# Patient Record
Sex: FEMALE | Race: WHITE | NOT HISPANIC OR LATINO | Employment: FULL TIME | URBAN - METROPOLITAN AREA
[De-identification: names, ages, dates, MRNs, and addresses within clinical notes are randomized per-mention and may not be internally consistent; named-entity substitution may affect disease eponyms.]

---

## 2022-08-09 ENCOUNTER — LAB REQUISITION (OUTPATIENT)
Dept: LAB | Facility: CLINIC | Age: 52
End: 2022-08-09

## 2022-08-09 DIAGNOSIS — Z01.419 ENCOUNTER FOR GYNECOLOGICAL EXAMINATION (GENERAL) (ROUTINE) WITHOUT ABNORMAL FINDINGS: ICD-10-CM

## 2022-08-09 LAB
CHOLEST SERPL-MCNC: 228 MG/DL
HBA1C MFR BLD: 5.6 %
HDLC SERPL-MCNC: 78 MG/DL
LDLC SERPL CALC-MCNC: 138 MG/DL
NONHDLC SERPL-MCNC: 150 MG/DL
TRIGL SERPL-MCNC: 58 MG/DL
TSH SERPL DL<=0.005 MIU/L-ACNC: 0.93 UIU/ML (ref 0.3–4.2)

## 2022-08-09 PROCEDURE — 83036 HEMOGLOBIN GLYCOSYLATED A1C: CPT | Performed by: OBSTETRICS & GYNECOLOGY

## 2022-08-09 PROCEDURE — 80061 LIPID PANEL: CPT | Performed by: OBSTETRICS & GYNECOLOGY

## 2022-08-09 PROCEDURE — 84443 ASSAY THYROID STIM HORMONE: CPT | Performed by: OBSTETRICS & GYNECOLOGY

## 2023-02-24 ENCOUNTER — TRANSCRIBE ORDERS (OUTPATIENT)
Dept: OTHER | Age: 53
End: 2023-02-24

## 2023-02-24 DIAGNOSIS — M54.32 SCIATICA OF LEFT SIDE: Primary | ICD-10-CM

## 2023-05-05 ENCOUNTER — THERAPY VISIT (OUTPATIENT)
Dept: PHYSICAL THERAPY | Facility: CLINIC | Age: 53
End: 2023-05-05
Attending: PHYSICIAN ASSISTANT
Payer: COMMERCIAL

## 2023-05-05 DIAGNOSIS — G89.29 CHRONIC BILATERAL LOW BACK PAIN WITH LEFT-SIDED SCIATICA: ICD-10-CM

## 2023-05-05 DIAGNOSIS — M54.42 CHRONIC BILATERAL LOW BACK PAIN WITH LEFT-SIDED SCIATICA: ICD-10-CM

## 2023-05-05 PROCEDURE — 97161 PT EVAL LOW COMPLEX 20 MIN: CPT | Mod: GP | Performed by: PHYSICAL THERAPIST

## 2023-05-05 PROCEDURE — 97110 THERAPEUTIC EXERCISES: CPT | Mod: GP | Performed by: PHYSICAL THERAPIST

## 2023-05-05 PROCEDURE — 97112 NEUROMUSCULAR REEDUCATION: CPT | Mod: GP | Performed by: PHYSICAL THERAPIST

## 2023-05-05 NOTE — PROGRESS NOTES
Physical Therapy Initial Evaluation  Subjective:    Patient Health History  Zeynep Truong being seen for LBP with L sciatica.     Problem began: 2/24/2023 (MD appt).   Problem occurred: Patient reporting that her symptoms have been on/off for a few years after starting to increase activity with snow shoeing/hiking   Pain is reported as 2/10 (pu to 8/10) on pain scale.  General health as reported by patient is excellent.  Pertinent medical history includes: none.   Red flags:  Pain at rest/night.  Medical allergies: none.   Surgeries include:  None.    Current medications:  None.    Current occupation is Teacher.   Primary job tasks include:  Prolonged standing.   Other job/home tasks details: , work at a [employer].                Therapist Generated HPI Evaluation         Type of problem:  Lumbar.    This is a chronic (recurrent, changing) condition.  Condition occurred with:  Insidious onset (pt did increase her activity with hiking and snow shoeing during COVID).  Where condition occurred: for unknown reasons.  Site of Pain: B L-S.  Pain is described as aching, shooting, stabbing, burning, cramping and sharp and is intermittent.  Pain radiates to:  Knee left, lower leg left, thigh left, foot left and gluteals left (L>R  hip). Pain is worse during the night.  Since onset symptoms are gradually worsening.  Associated symptoms:  Loss of motion/stiffness, numbness and tingling (L LE). Symptoms are exacerbated by bending, walking, sitting and lying down  and relieved by NSAID's.  Imaging testing: none.  Past treatment: none.   Restrictions due to condition include:  Working in normal job without restrictions.  Barriers include:  None as reported by patient.                        Objective:    Gait:    Gait Type:  Normal         Flexibility/Screens:       Lower Extremity:  Decreased left lower extremity flexibility:Piriformis; Hip Flexors; Hamstrings and Gastroc    Decreased right lower  extremity flexibility:  Piriformis; Hip Flexors; Hamstrings and Gastroc  Spine:  Decreased left spine flexibility:  Quadratus Lumborum    Decreased right spine flexibility:  Quadratus Lumborum             Lumbar/SI Evaluation    Lumbar Myotomes:  normal                Lumbar Dermtomes:  normal                Neural Tension/Mobility:    Left side:  SLR and SLR w/DF positive.  Left side:Femoral Nerve or Slump  negative.     Lumbar Palpation:    Tenderness present at Left:    Erector Spinae; Piriformis and Hip Flexors  Tenderness present at Right: Erector Spinae and Hip Flexors      Spinal Segmental Conclusions:     Level: Hypo noted at L2, L3, L4, L5 and S1                                                     [facility] Lumbar Evaluation    Posture:  Sitting: fair  Standing: fair  Lordosis: Accentuated  Lateral Shift: no  Correction of Posture: better  Other Observations: lower thoracic increased kyphosis abd L thoracic convex cruve  Movement Loss:  Flexion (Flex): mod and pain  Extension (EXT): mod  Side Glide R (SG R): min  Side Glide L (SG L): nil  Test Movements:  FIS: After: no worse  Mechanical Response: no effectPretest Movements: L thigh and L foot N/T  Repeat FIS: During: increases  After: no worse  Mechanical Response: no effect  EIS: During: increases  After: no worse  Mechanical Response: no effect  Repeat EIS: During: decreases  After: no better  Mechanical Response: no effect    EIL: During: decreases  After: no better  Mechanical Response: no effect  Repeat EIL: During: centralizing and decreases  After: better  Mechanical Response: IncROM      Static Tests:          Lying Prone in Extension: prone lying and MEDINA's both decrease(reduced L foot N/T)/B    Conclusion: derangement (vs other)  Principle of Treatment:  Posture Correction: slouch/over correct, location of neutral spine, use of lumbar support patient education regarding structures of LB and their function, effects of poor posture on the  intervertebral disc, issued TYOB book    Extension: prone lying, MEDINA's, press-ups and EIS(sagging hips into counter)                                           ROS    Assessment/Plan:    Patient is a 52 year old female with lumbar complaints.    Patient has the following significant findings with corresponding treatment plan.                Diagnosis 1:  B LBP with L sciaitca  Pain -  manual therapy, self management, education, directional preference exercise and home program  Decreased ROM/flexibility - manual therapy, therapeutic exercise, therapeutic activity and home program  Decreased joint mobility - manual therapy, therapeutic exercise, therapeutic activity and home program  Decreased function - therapeutic activities and home program  Impaired posture - neuro re-education, therapeutic activities and home program      Cumulative Therapy Evaluation is: Low complexity.    Previous and current functional limitations:  (See Goal Flow Sheet for this information)    Short term and Long term goals: (See Goal Flow Sheet for this information)     Communication ability:  Patient appears to be able to clearly communicate and understand verbal and written communication and follow directions correctly.  Treatment Explanation - The following has been discussed with the patient:   RX ordered/plan of care  Anticipated outcomes  Possible risks and side effects  This patient would benefit from PT intervention to resume normal activities.   Rehab potential is good.    Frequency:  1 X week, once daily  Duration:  for 6 weeks  Discharge Plan:  Achieve all LTG.  Independent in home treatment program.  Reach maximal therapeutic benefit.    Please refer to the daily flowsheet for treatment today, total treatment time and time spent performing 1:1 timed codes.

## 2023-06-15 PROBLEM — M54.42 CHRONIC BILATERAL LOW BACK PAIN WITH LEFT-SIDED SCIATICA: Status: RESOLVED | Noted: 2023-05-05 | Resolved: 2023-06-15

## 2023-06-15 PROBLEM — G89.29 CHRONIC BILATERAL LOW BACK PAIN WITH LEFT-SIDED SCIATICA: Status: RESOLVED | Noted: 2023-05-05 | Resolved: 2023-06-15

## 2024-02-20 ENCOUNTER — LAB REQUISITION (OUTPATIENT)
Dept: LAB | Facility: CLINIC | Age: 54
End: 2024-02-20

## 2024-02-20 DIAGNOSIS — Z12.4 ENCOUNTER FOR SCREENING FOR MALIGNANT NEOPLASM OF CERVIX: ICD-10-CM

## 2024-02-20 PROCEDURE — 87624 HPV HI-RISK TYP POOLED RSLT: CPT | Performed by: OBSTETRICS & GYNECOLOGY

## 2024-02-20 PROCEDURE — G0145 SCR C/V CYTO,THINLAYER,RESCR: HCPCS | Performed by: OBSTETRICS & GYNECOLOGY

## 2024-02-22 LAB
BKR LAB AP GYN ADEQUACY: NORMAL
BKR LAB AP GYN INTERPRETATION: NORMAL
BKR LAB AP HPV REFLEX: NORMAL
BKR LAB AP LMP: NORMAL
BKR LAB AP PREVIOUS ABNL DX: NORMAL
BKR LAB AP PREVIOUS ABNORMAL: NORMAL
PATH REPORT.COMMENTS IMP SPEC: NORMAL
PATH REPORT.COMMENTS IMP SPEC: NORMAL
PATH REPORT.RELEVANT HX SPEC: NORMAL

## 2024-02-27 LAB
HUMAN PAPILLOMA VIRUS 16 DNA: NEGATIVE
HUMAN PAPILLOMA VIRUS 18 DNA: NEGATIVE
HUMAN PAPILLOMA VIRUS FINAL DIAGNOSIS: NORMAL
HUMAN PAPILLOMA VIRUS OTHER HR: NEGATIVE